# Patient Record
Sex: FEMALE | Race: BLACK OR AFRICAN AMERICAN | URBAN - METROPOLITAN AREA
[De-identification: names, ages, dates, MRNs, and addresses within clinical notes are randomized per-mention and may not be internally consistent; named-entity substitution may affect disease eponyms.]

---
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
## 2025-06-26 NOTE — PROCEDURE: DEFER
No
Size Of Lesion In Cm (Optional): 0
Instructions (Optional): Defers systemic medication at this time
Detail Level: Detailed
Introduction Text (Please End With A Colon): The following procedure was deferred: